# Patient Record
Sex: FEMALE | Race: WHITE | URBAN - METROPOLITAN AREA
[De-identification: names, ages, dates, MRNs, and addresses within clinical notes are randomized per-mention and may not be internally consistent; named-entity substitution may affect disease eponyms.]

---

## 2020-04-24 ENCOUNTER — HOSPITAL ENCOUNTER (OUTPATIENT)
Dept: ONCOLOGY | Age: 60
Setting detail: INFUSION SERIES
Discharge: HOME | End: 2020-04-24
Payer: MEDICARE

## 2020-04-24 PROCEDURE — 81371 HLA I & II TYPE VERIFY LR: CPT

## 2020-04-24 NOTE — PROGRESS NOTES
Pt in outpatient infusion for labs and teaching/education per transplant coordinator. Labs drawn per . Pt discharged ambulatory.

## 2023-07-25 ENCOUNTER — APPOINTMENT (OUTPATIENT)
Dept: GENERAL RADIOLOGY | Age: 63
End: 2023-07-25
Payer: MEDICARE

## 2023-07-25 ENCOUNTER — HOSPITAL ENCOUNTER (EMERGENCY)
Age: 63
Discharge: HOME | End: 2023-07-25
Attending: EMERGENCY MEDICINE
Payer: MEDICARE

## 2023-07-25 VITALS
HEIGHT: 66 IN | DIASTOLIC BLOOD PRESSURE: 97 MMHG | WEIGHT: 250 LBS | TEMPERATURE: 98.7 F | RESPIRATION RATE: 16 BRPM | HEART RATE: 100 BPM | BODY MASS INDEX: 40.18 KG/M2 | SYSTOLIC BLOOD PRESSURE: 163 MMHG | OXYGEN SATURATION: 100 %

## 2023-07-25 DIAGNOSIS — S99.921A INJURY OF TOE ON RIGHT FOOT, INITIAL ENCOUNTER: ICD-10-CM

## 2023-07-25 DIAGNOSIS — S92.514A CLOSED NONDISPLACED FRACTURE OF PROXIMAL PHALANX OF LESSER TOE OF RIGHT FOOT, INITIAL ENCOUNTER: Primary | ICD-10-CM

## 2023-07-25 PROCEDURE — 6370000000 HC RX 637 (ALT 250 FOR IP): Performed by: EMERGENCY MEDICINE

## 2023-07-25 PROCEDURE — 99283 EMERGENCY DEPT VISIT LOW MDM: CPT

## 2023-07-25 PROCEDURE — 73630 X-RAY EXAM OF FOOT: CPT

## 2023-07-25 RX ORDER — HYDROCODONE BITARTRATE AND ACETAMINOPHEN 5; 325 MG/1; MG/1
1 TABLET ORAL ONCE
Status: COMPLETED | OUTPATIENT
Start: 2023-07-25 | End: 2023-07-25

## 2023-07-25 RX ORDER — NAPROXEN 500 MG/1
500 TABLET ORAL 2 TIMES DAILY WITH MEALS
Qty: 10 TABLET | Refills: 0 | Status: SHIPPED | OUTPATIENT
Start: 2023-07-25 | End: 2023-07-30

## 2023-07-25 RX ADMIN — HYDROCODONE BITARTRATE AND ACETAMINOPHEN 1 TABLET: 5; 325 TABLET ORAL at 20:24

## 2023-07-25 ASSESSMENT — PAIN SCALES - GENERAL: PAINLEVEL_OUTOF10: 2

## 2023-07-25 ASSESSMENT — PAIN DESCRIPTION - DESCRIPTORS: DESCRIPTORS: ACHING

## 2023-07-25 ASSESSMENT — PAIN - FUNCTIONAL ASSESSMENT: PAIN_FUNCTIONAL_ASSESSMENT: 0-10

## 2023-07-25 ASSESSMENT — PAIN DESCRIPTION - ORIENTATION: ORIENTATION: RIGHT

## 2023-07-25 ASSESSMENT — PAIN DESCRIPTION - LOCATION: LOCATION: TOE (COMMENT WHICH ONE)

## 2023-07-26 ASSESSMENT — ENCOUNTER SYMPTOMS: SHORTNESS OF BREATH: 0

## 2023-07-26 NOTE — ED NOTES
Writer reviewed discharge instructions, medications, and follow-up with PCP; patient verbalized understanding. . Patient stable, ambulatory with steady gait, GCS 15, no signs/ symptoms of acute distress, respirations unlabored, and with family.       Yaniv Doll RN  07/25/23 1385

## 2023-07-26 NOTE — ED PROVIDER NOTES
Emergency Department Attending Physician Note  Location: [address] [place] [address]  ED  7/25/2023       Pt Name: Lia Mercado  MRN: [id number]  [address] [facility], [place], operated by [facility] 1960    Date of evaluation: 7/25/2023  Provider: Yarelis Hester DO  PCP: Zuleima Mora MD    Note Started: 8:10 PM EDT 7/25/23    CHIEF COMPLAINT  Chief Complaint   Patient presents with    Toe Injury     States caught 4th right toe on chair. Pain and bruising noted. Possible dislocation        HISTORY OF PRESENT ILLNESS:  History obtained by patient. Limitations to history : None. Lia Mercado is a 60 y.o. female with a significant PMHx of CAD, anxiety, hypertension, hyperlipidemia, and other comorbidities as listed below, presenting emergency department today with concerns of a right fourth toe injury;   was walking through the house and kicked the table leg. Says her toe is \"crooked,\" and did not try to pull on it. Otherwise, denies any other concerns or injuries. No recent illnesses. Nursing Notes were all reviewed and agreed with or any disagreements were addressed in the HPI.       MEDICAL HISTORY  Past Medical History:   Diagnosis Date    Angina, class III ([address]) 3/17/2014    Anxiety     CAD (coronary artery disease)     s/p stents x 8    Hyperlipidemia 5/13/2014    Hypertension     Irritable bowel syndrome         SURGICAL HISTORY  Past Surgical History:   Procedure Laterality Date    CARDIAC SURGERY      stentsx7    HYSTERECTOMY (CERVIX STATUS UNKNOWN)         CURRENT MEDICATIONS  Discharge Medication List as of 7/25/2023  9:18 PM        CONTINUE these medications which have NOT CHANGED    Details   aspirin 81 MG chewable tablet Take 1 tablet by mouth daily, Disp-30 tablet, R-3      spironolactone (ALDACTONE) 25 MG tablet Take 1 tablet by mouth daily, Disp-30 tablet, R-3      diclofenac (VOLTAREN) 75 MG EC tablet Take 75 mg by mouth 2 times daily as needed for Pain      sucralfate (CARAFATE) 1 GM tablet Take 1 g by mouth 4 times daily

## 2024-11-07 ENCOUNTER — HOSPITAL ENCOUNTER (EMERGENCY)
Age: 64
Discharge: HOME | End: 2024-11-07
Attending: EMERGENCY MEDICINE
Payer: MEDICARE

## 2024-11-07 VITALS
WEIGHT: 285 LBS | TEMPERATURE: 98.6 F | OXYGEN SATURATION: 96 % | HEART RATE: 92 BPM | RESPIRATION RATE: 20 BRPM | DIASTOLIC BLOOD PRESSURE: 76 MMHG | SYSTOLIC BLOOD PRESSURE: 158 MMHG | BODY MASS INDEX: 45.8 KG/M2 | HEIGHT: 66 IN

## 2024-11-07 DIAGNOSIS — R50.9 FEVER, UNSPECIFIED FEVER CAUSE: ICD-10-CM

## 2024-11-07 DIAGNOSIS — R03.0 ELEVATED BLOOD PRESSURE READING: ICD-10-CM

## 2024-11-07 DIAGNOSIS — J02.9 SORE THROAT: Primary | ICD-10-CM

## 2024-11-07 DIAGNOSIS — R09.81 NASAL CONGESTION: ICD-10-CM

## 2024-11-07 DIAGNOSIS — R06.02 SHORTNESS OF BREATH: ICD-10-CM

## 2024-11-07 LAB
FLUAV RNA UPPER RESP QL NAA+PROBE: NEGATIVE
FLUBV AG NPH QL: NEGATIVE
SARS-COV-2 RDRP RESP QL NAA+PROBE: NOT DETECTED

## 2024-11-07 PROCEDURE — 87804 INFLUENZA ASSAY W/OPTIC: CPT

## 2024-11-07 PROCEDURE — 99283 EMERGENCY DEPT VISIT LOW MDM: CPT

## 2024-11-07 PROCEDURE — 87635 SARS-COV-2 COVID-19 AMP PRB: CPT

## 2024-11-07 RX ORDER — FAMOTIDINE 40 MG/1
40 TABLET, FILM COATED ORAL DAILY
COMMUNITY

## 2024-11-07 RX ORDER — EZETIMIBE 10 MG/1
10 TABLET ORAL DAILY
COMMUNITY

## 2024-11-07 RX ORDER — METOPROLOL SUCCINATE 50 MG/1
50 TABLET, EXTENDED RELEASE ORAL DAILY
COMMUNITY

## 2024-11-07 RX ORDER — VENLAFAXINE 25 MG/1
25 TABLET ORAL 3 TIMES DAILY
COMMUNITY

## 2024-11-07 ASSESSMENT — PAIN - FUNCTIONAL ASSESSMENT: PAIN_FUNCTIONAL_ASSESSMENT: NONE - DENIES PAIN

## 2024-11-07 NOTE — ED TRIAGE NOTES
65y/o female presents to the ED with sore throat, fever and cough onset two days ago. +sob. +fever. Pt presents with granddaughter (7y/o) with same symptoms.

## 2024-11-07 NOTE — ED PROVIDER NOTES
[facility] EMERGENCY DEPARTMENT  EMERGENCY DEPARTMENT ENCOUNTER        Pt Name: Adriana Carmona  MRN: [id number]  Birthdate 1960  Date of evaluation: 11/7/2024  Provider: Emmett Banks MD  PCP: Bryon North MD      CHIEF COMPLAINT       Chief Complaint   Patient presents with    Pharyngitis    Fever       HISTORY OFPRESENT ILLNESS   (Location/Symptom, Timing/Onset, Context/Setting, Quality, Duration, Modifying Factors,Severity)  Note limiting factors.     Adriana Carmona is a 64 y.o. female presenting today due to concern for having a sore throat along with a fever over the last 2 days similar to her granddaughter who started having symptoms about 5 days ago.  She also has some congestion.  She reports having some shortness of breath related to her sinuses but also feels slightly short of breath in her chest.  She denies any chest pain or pain with breathing.  She has a prior cardiac history and denies any cardiac concerns at this time.  She denies any significant headache.  No vomiting or diarrhea.  Due to concern for having congestion along with sore throat and fever for the past 2 days, she came to the emergency department for further evaluation.        REVIEW OF SYSTEMS    (2-9 systems for level 4, 10 or more for level 5)     Review of Systems   Constitutional:  Positive for chills and fever.   HENT:  Positive for congestion, sinus pressure and sore throat. Negative for trouble swallowing.    Respiratory:  Positive for cough and shortness of breath (Occasional).    Cardiovascular:  Negative for chest pain and leg swelling.   Gastrointestinal:  Negative for abdominal pain, nausea and vomiting.   Musculoskeletal:  Negative for back pain and neck pain.   Neurological:  Positive for headaches (Mild).   Psychiatric/Behavioral:  The patient is not nervous/anxious.          Positives and Pertinent negatives as per HPI.      PASTMEDICAL HISTORY     Past Medical History:   Diagnosis Date    Angina, class

## 2024-11-07 NOTE — DISCHARGE INSTRUCTIONS
Take Tylenol as needed for pain or fever.  Do not take more than 3 g/day.    Follow-up with your doctor in 2 to 3 days for further evaluation.  Call for appointment.    We did discuss doing a chest x-ray and EKG but at this time you are declining.  If you do develop any persistent shortness of breath associate with chest pain, start coughing up blood, have persistent fever, new vomiting, or any other concerns over the next 6 to 24 hours, then return to the emergency department immediately for further evaluation.    At this time, this is more likely a viral illness since you have similar symptoms as your granddaughter.

## 2024-11-08 ENCOUNTER — HOSPITAL ENCOUNTER (EMERGENCY)
Age: 64
Discharge: HOME | End: 2024-11-08
Payer: MEDICARE

## 2024-11-08 VITALS
OXYGEN SATURATION: 99 % | DIASTOLIC BLOOD PRESSURE: 79 MMHG | SYSTOLIC BLOOD PRESSURE: 174 MMHG | TEMPERATURE: 98.3 F | HEART RATE: 80 BPM | RESPIRATION RATE: 16 BRPM

## 2024-11-08 DIAGNOSIS — J02.9 ACUTE PHARYNGITIS, UNSPECIFIED ETIOLOGY: Primary | ICD-10-CM

## 2024-11-08 LAB — S PYO AG THROAT QL: NEGATIVE

## 2024-11-08 PROCEDURE — 99283 EMERGENCY DEPT VISIT LOW MDM: CPT

## 2024-11-08 PROCEDURE — 87880 STREP A ASSAY W/OPTIC: CPT

## 2024-11-08 PROCEDURE — 6360000002 HC RX W HCPCS: Performed by: PHYSICIAN ASSISTANT

## 2024-11-08 RX ORDER — DEXAMETHASONE 4 MG/1
10 TABLET ORAL ONCE
Status: COMPLETED | OUTPATIENT
Start: 2024-11-08 | End: 2024-11-08

## 2024-11-08 RX ADMIN — DEXAMETHASONE 10 MG: 4 TABLET ORAL at 15:12

## 2024-11-08 ASSESSMENT — ENCOUNTER SYMPTOMS
SINUS PRESSURE: 1
TROUBLE SWALLOWING: 0
BACK PAIN: 0
NAUSEA: 0
SHORTNESS OF BREATH: 1
SORE THROAT: 1
VOMITING: 0
ABDOMINAL PAIN: 0
COUGH: 1

## 2024-11-08 ASSESSMENT — PAIN - FUNCTIONAL ASSESSMENT: PAIN_FUNCTIONAL_ASSESSMENT: 0-10

## 2024-11-08 ASSESSMENT — PAIN SCALES - GENERAL: PAINLEVEL_OUTOF10: 2

## 2024-11-09 NOTE — ED PROVIDER NOTES
[facility]  ED  Emergency Department Encounter    Patient Name: Adriana Carmona  MRN: [id number]  YOB: 1960  Date of Evaluation: 11/8/2024  Provider: Bryon North MD  Note Started: 11:04 AM EST 11/9/24    CHIEF COMPLAINT  Illness (Pt reports cough, subjective fever, sore throat, nasal congestion x3 days. )    SHARED SERVICE VISIT  Evaluated by [facility].  My supervising physician was available for consultation.     HISTORY OF PRESENT ILLNESS  Adriana Carmona is a 64 y.o. female who presents to the ED for evaluation of several day history of shortness of breath.  Patient states that granddaughter started with similar symptoms several days ago.  She denies congestion or ear pain.  No headaches or dizziness.  Mild cough.  Nonproductive.  No hemoptysis.  She is a non-smoker.  No fevers chills.  No pain in the abdomen.  No leg pain or swelling.  Reports that her granddaughter was seen at urgent care and swab for COVID and flu which were negative and was diagnosed with viral illness..    No other complaints, modifying factors or associated symptoms.     Nursing notes reviewed were all reviewed and agreed with or any disagreements were addressed in the HPI.    PMH:  Past Medical History:   Diagnosis Date    Angina, class III ([facility]) 3/17/2014    Anxiety     CAD (coronary artery disease)     s/p stents x 8    Hyperlipidemia 5/13/2014    Hypertension     Irritable bowel syndrome      Surgical History:  Past Surgical History:   Procedure Laterality Date    CARDIAC SURGERY      stentsx7    HYSTERECTOMY (CERVIX STATUS UNKNOWN)       Family History:  Family History   Problem Relation Age of Onset    Heart Disease Mother         Afib     Social History:  Social History     Socioeconomic History    Marital status:      Spouse name: Not on file    Number of children: Not on file    Years of education: Not on file    Highest education level: Not on file   Occupational History    Not on file   Tobacco